# Patient Record
Sex: MALE | Race: WHITE | URBAN - METROPOLITAN AREA
[De-identification: names, ages, dates, MRNs, and addresses within clinical notes are randomized per-mention and may not be internally consistent; named-entity substitution may affect disease eponyms.]

---

## 2019-04-02 ENCOUNTER — MEDICAL CORRESPONDENCE (OUTPATIENT)
Dept: HEALTH INFORMATION MANAGEMENT | Facility: CLINIC | Age: 49
End: 2019-04-02

## 2019-05-01 NOTE — PROGRESS NOTES
[place] - Rheumatology Clinic Visit     Dixon Steele MRN# [id number]   YOB: 1970    Primary care provider: No primary care provider on file.  May 7, 2019          Assessment and Plan:   # Polyarthritis 3/19  # [place] travel 2/19 followed by Salmonella and E.coli gastro-enteritis in 3/19  # Anemia, thrombocytosis, elevated sed rate > 30; elevated CRP > 60    Synovial fluid - negative for crystals; cell count > 5000  Lyme neg.   Syphilis screen negative.   RF, SABINO neg.   Uric acid 5.3  Creat, AST, ALT within normal limits  HBV, HCV, HIV neg.     Likely reactive arthritis vs gout.   Though uric acid level is not high and synovial fluid negative for gout, still gout is possible. I have ordered DECT. If Dual energy CT is also negative for gout, then we will treat this as reactive arthritis. Labs ordered.     Prednisone - high dose did not help. This is unusual. We will try methylprednisolone.   Local cortisone injection in one knee helped.     Anemia- no GI bleed symptoms. Watch.     The labs from patient records are reviewed.     I will be back in touch with the patient through mychart/letter when results are available.     Patient agrees with the above mentioned treatment plan.     Most Recent Immunizations   Administered Date(s) Administered     TDAP Vaccine (Adacel) 05/28/2008       Orders Placed This Encounter   Procedures     CT Foot Left w/o Contrast- Dual Energy     Cyclic Citrullinated Peptide Antibody IgG     Erythrocyte sedimentation rate auto     CRP inflammation     CBC with platelets differential     HLA-B27 Typing       No follow-ups on file.    There are no discontinued medications.  Current Outpatient Medications   Medication Sig Dispense Refill     lisinopril (PRINIVIL/ZESTRIL) 20 MG tablet Take 20 mg by mouth daily       methylPREDNISolone (MEDROL) 32 MG tablet Take 1 tablet (32 mg) by mouth daily Take 32 mg PO daily X 5 days; then 16mg PO daily X 5 days and then 8mg PO daily X 5 days  and then stop 10 tablet 0     oxyCODONE-acetaminophen (PERCOCET) 5-325 MG tablet Take 1-2 tablets by mouth as needed for pain         Mark Jones MD  [facility]          Active Problem List:     Patient Active Problem List    Diagnosis Date Noted     Laceration of finger 05/29/2008     Priority: Medium            History of Present Illness:     Chief Complaint   Patient presents with     Establish Care     Referral     Bev Moe [facility]       May 1, 2019  Have you ever seen a rheumatologist NO Who NA When NA  Joint pain history  Onset: Patient is here to establish care for pain and swelling in left foot, ankle, and knee for 2 months. Pain and swelling is consistant.  Involved joints: see above  Pain scale:  7/10     Wakes the patient from sleep : Yes  Morning stiffness:Yes for 3 minutes  Meds used:percocet, aleve, ibuprofen     Interim history  Since last visit:  1. Infections - Yes, March salmincola and E-coli-shortly after had symptoms of gout  2. New symptoms/medical problem - Yes, shoulder and upper back pain  3. Any side effects from Rheum medications -NA  3. ER visits/Hospitalizations/surgeries - No  4. Last PCP visit: 3/26/19      Wt Readings from Last 4 Encounters:   05/07/19 99.8 kg (220 lb)   06/11/08 93.4 kg (206 lb)   06/03/08 94.8 kg (209 lb)   05/29/08 94.8 kg (209 lb)     Rash under left breast area  chest pain and back pain X 2 weeks- patient to discuss with PCP    No h/o myalgia, unintentional weight loss, fevers, swollen glands  No family or personal history of psoriasis, ulcerative colitis or chron's disease. No h/o iritis.  Patient denies any raynauds  No h/o persistent shortness of breath, cough,   No h/o persistent vomiting, diarrhea, abdominal pain  No h/o hematochezia, hematuria, hemoptysis  No h/o seizure    BP Readings from Last 3 Encounters:   05/07/19 122/86   06/11/08 100/60   06/03/08 108/80              Review of Systems:   Complete ROS negative  "except for symptoms mentioned in the HPI          Past Medical History:   No major past medical problems.   No past surgical history on file.         Social History:     Social History     Occupational History     Not on file   Tobacco Use     Smoking status: Never Smoker     Smokeless tobacco: Never Used   Substance and Sexual Activity     Alcohol use: Not on file     Drug use: Not on file     Sexual activity: Not on file            Family History:   No reported family history of rheumatological problems.          Allergies:   No Known Allergies         Medications:     Current Outpatient Medications   Medication Sig Dispense Refill     lisinopril (PRINIVIL/ZESTRIL) 20 MG tablet Take 20 mg by mouth daily       methylPREDNISolone (MEDROL) 32 MG tablet Take 1 tablet (32 mg) by mouth daily Take 32 mg PO daily X 5 days; then 16mg PO daily X 5 days and then 8mg PO daily X 5 days and then stop 10 tablet 0     oxyCODONE-acetaminophen (PERCOCET) 5-325 MG tablet Take 1-2 tablets by mouth as needed for pain              Physical Exam:   Blood pressure 122/86, pulse 61, temperature 98.1  F (36.7  C), temperature source Oral, height 1.753 m (5' 9\"), weight 99.8 kg (220 lb), SpO2 98 %.  Wt Readings from Last 4 Encounters:   05/07/19 99.8 kg (220 lb)   06/11/08 93.4 kg (206 lb)   06/03/08 94.8 kg (209 lb)   05/29/08 94.8 kg (209 lb)       Constitutional: well-developed, appearing stated age; cooperative  Eyes: normal conjunctiva, sclera  ENT: nl external ears, nose, lips.No mucous membrane lesions, normal saliva pool  Neck: no cervical lymphadenopathy  Resp: lungs clear to auscultation in the bases,   CV: RRR, no added sounds  GI: Abdomen soft and no tenderness  : not tested  Lymph: no cervical, supraclavicular or epitrochlear nodes  MS: Joint tenderness and swelling in all MTPs in left foot. Edema in left ankle and foot. Left ankle ROM is good. Mild effusion in left knee with slight reduction of ROM. Right knee is within " normal limits.   All shoulder, elbow, wrist, MCP/PIP/DIP, hip, joints were examined and  found without active synovitis or major deformity. Full ROM.  No dactylitis,  tenosynovitis, enthesopathy.  Skin: no rash in exposed areas  Psych: nl judgement, orientation, memory, affect.         Data:         Mark Jones MD    [facility]

## 2019-05-07 ENCOUNTER — ANCILLARY PROCEDURE (OUTPATIENT)
Dept: CT IMAGING | Facility: CLINIC | Age: 49
End: 2019-05-07
Attending: INTERNAL MEDICINE
Payer: COMMERCIAL

## 2019-05-07 ENCOUNTER — OFFICE VISIT (OUTPATIENT)
Dept: RHEUMATOLOGY | Facility: CLINIC | Age: 49
End: 2019-05-07
Payer: COMMERCIAL

## 2019-05-07 VITALS
TEMPERATURE: 98.1 F | WEIGHT: 220 LBS | DIASTOLIC BLOOD PRESSURE: 86 MMHG | SYSTOLIC BLOOD PRESSURE: 122 MMHG | OXYGEN SATURATION: 98 % | HEIGHT: 69 IN | HEART RATE: 61 BPM | BODY MASS INDEX: 32.58 KG/M2

## 2019-05-07 DIAGNOSIS — M19.90 INFLAMMATORY ARTHRITIS: Primary | ICD-10-CM

## 2019-05-07 DIAGNOSIS — M19.90 INFLAMMATORY ARTHRITIS: ICD-10-CM

## 2019-05-07 LAB
BASOPHILS # BLD AUTO: 0.1 10E9/L (ref 0–0.2)
BASOPHILS NFR BLD AUTO: 0.5 %
CRP SERPL-MCNC: 11 MG/L (ref 0–8)
DIFFERENTIAL METHOD BLD: ABNORMAL
EOSINOPHIL # BLD AUTO: 0 10E9/L (ref 0–0.7)
EOSINOPHIL NFR BLD AUTO: 0.2 %
ERYTHROCYTE [DISTWIDTH] IN BLOOD BY AUTOMATED COUNT: 13.9 % (ref 10–15)
ERYTHROCYTE [SEDIMENTATION RATE] IN BLOOD BY WESTERGREN METHOD: 33 MM/H (ref 0–15)
HCT VFR BLD AUTO: 33.3 % (ref 40–53)
HGB BLD-MCNC: 10.5 G/DL (ref 13.3–17.7)
LYMPHOCYTES # BLD AUTO: 3 10E9/L (ref 0.8–5.3)
LYMPHOCYTES NFR BLD AUTO: 30.5 %
MCH RBC QN AUTO: 27.6 PG (ref 26.5–33)
MCHC RBC AUTO-ENTMCNC: 31.5 G/DL (ref 31.5–36.5)
MCV RBC AUTO: 88 FL (ref 78–100)
MONOCYTES # BLD AUTO: 0.9 10E9/L (ref 0–1.3)
MONOCYTES NFR BLD AUTO: 9 %
NEUTROPHILS # BLD AUTO: 6 10E9/L (ref 1.6–8.3)
NEUTROPHILS NFR BLD AUTO: 59.8 %
PLATELET # BLD AUTO: 313 10E9/L (ref 150–450)
RBC # BLD AUTO: 3.8 10E12/L (ref 4.4–5.9)
WBC # BLD AUTO: 10 10E9/L (ref 4–11)

## 2019-05-07 PROCEDURE — 99204 OFFICE O/P NEW MOD 45 MIN: CPT | Performed by: INTERNAL MEDICINE

## 2019-05-07 PROCEDURE — 81374 HLA I TYPING 1 ANTIGEN LR: CPT | Performed by: INTERNAL MEDICINE

## 2019-05-07 PROCEDURE — 36415 COLL VENOUS BLD VENIPUNCTURE: CPT | Performed by: INTERNAL MEDICINE

## 2019-05-07 PROCEDURE — 85652 RBC SED RATE AUTOMATED: CPT | Performed by: INTERNAL MEDICINE

## 2019-05-07 PROCEDURE — 86481 TB AG RESPONSE T-CELL SUSP: CPT | Performed by: INTERNAL MEDICINE

## 2019-05-07 PROCEDURE — 85025 COMPLETE CBC W/AUTO DIFF WBC: CPT | Performed by: INTERNAL MEDICINE

## 2019-05-07 PROCEDURE — 86200 CCP ANTIBODY: CPT | Performed by: INTERNAL MEDICINE

## 2019-05-07 PROCEDURE — 86140 C-REACTIVE PROTEIN: CPT | Performed by: INTERNAL MEDICINE

## 2019-05-07 RX ORDER — LISINOPRIL 20 MG/1
20 TABLET ORAL DAILY
COMMUNITY
Start: 2019-03-04 | End: 2020-03-03

## 2019-05-07 RX ORDER — METHYLPREDNISOLONE 32 MG/1
32 TABLET ORAL DAILY
Qty: 10 TABLET | Refills: 0 | Status: SHIPPED | OUTPATIENT
Start: 2019-05-07 | End: 2019-05-28

## 2019-05-07 RX ORDER — OXYCODONE AND ACETAMINOPHEN 5; 325 MG/1; MG/1
1-2 TABLET ORAL PRN
COMMUNITY
Start: 2019-03-23

## 2019-05-07 ASSESSMENT — ROUTINE ASSESSMENT OF PATIENT INDEX DATA (RAPID3)
RAPID3 INTERPRETATION: HIGH > 12.0
TOTAL RAPID3 SCORE: 17.7

## 2019-05-07 ASSESSMENT — MIFFLIN-ST. JEOR: SCORE: 1868.29

## 2019-05-07 NOTE — NURSING NOTE
"Chief Complaint   Patient presents with     Establish Care     Referral     Bev Abhi [facility]       Initial /86   Pulse 61   Temp 98.1  F (36.7  C) (Oral)   Ht 1.753 m (5' 9\")   Wt 99.8 kg (220 lb)   SpO2 98%   BMI 32.49 kg/m   Estimated body mass index is 32.49 kg/m  as calculated from the following:    Height as of this encounter: 1.753 m (5' 9\").    Weight as of this encounter: 99.8 kg (220 lb).  Medication Reconciliation: complete    Have you ever seen a rheumatologist NO Who NA When NA  Joint pain history  Onset: Patient is here to establish care for pain and swelling in left foot, ankle, and knee for 2 months. Pain and swelling is consistant.  Involved joints: see above  Pain scale:  7/10     Wakes the patient from sleep : Yes  Morning stiffness:Yes for 3 minutes  Meds used:percocet, aleve, ibuprofen    Interim history  Since last visit:  1. Infections - Yes, March salmincola and E-coli-shortly after had symptoms of gout  2. New symptoms/medical problem - Yes, shoulder and upper back pain  3. Any side effects from Rheum medications -NA  3. ER visits/Hospitalizations/surgeries - No  4. Last PCP visit: 3/26/19  Wt Readings from Last 4 Encounters:   05/07/19 99.8 kg (220 lb)   06/11/08 93.4 kg (206 lb)   06/03/08 94.8 kg (209 lb)   05/29/08 94.8 kg (209 lb)     BP Readings from Last 3 Encounters:   05/07/19 122/86   06/11/08 100/60   06/03/08 108/80       "

## 2019-05-07 NOTE — DISCHARGE INSTRUCTIONS

## 2019-05-07 NOTE — LETTER
[facility]  [address] [address]  [phone]      5/13/2019       Dixon Steele  [address] [place] [address]        Dear Dixon,    I am writing you regarding your laboratory testing you had done recently. Upon review your inflammatory markers are elevated.   HLA B27 gene test is positive. This is consistent with my diagnosis of reactive arthritis.   TB screen normal.   CCP rheumatoid antibody normal.   Anemia noted.   We will discuss more when we meet 5/28/19.    Lab results attached.    Thank you for allowing me to participate in your care.    Sincerely,      Mark Jones MD   Rheumatology

## 2019-05-08 LAB — CCP AB SER IA-ACNC: 1 U/ML

## 2019-05-09 DIAGNOSIS — M19.90 INFLAMMATORY ARTHRITIS: Primary | ICD-10-CM

## 2019-05-09 LAB
GAMMA INTERFERON BACKGROUND BLD IA-ACNC: 0.03 IU/ML
M TB IFN-G BLD-IMP: NEGATIVE
M TB IFN-G CD4+ BCKGRND COR BLD-ACNC: >10 IU/ML
MITOGEN IGNF BCKGRD COR BLD-ACNC: 0 IU/ML
MITOGEN IGNF BCKGRD COR BLD-ACNC: 0.01 IU/ML

## 2019-05-09 NOTE — TELEPHONE ENCOUNTER
Patient called with results  CT scan was negative for gout.   My diagnosis for his arthritis is REACTIVE ARTHRITIS.   In addition to the temporary medrol that he is currently on, I recommend starting sulfasalazine (long-term medication for reactive arthritis). I can explain more about this medication when I see him in person. If he wants to get started now, I can send the RX.       Patient is in agreement with medication. Would like sent. T'd up. Please review and advise.     Patient wanted letter and lab results mailed to him.    Please review labs and send back to [place] for letter and labs to be mailed out.     Roxann Bates RN Flex

## 2019-05-09 NOTE — RESULT ENCOUNTER NOTE
Please call patient with the results:  CT scan was negative for gout.   My diagnosis for his arthritis is REACTIVE ARTHRITIS.   In addition to the temporary medrol that he is currently on, I recommend starting sulfasalazine (long-term medication for reactive arthritis). I can explain more about this medication when I see him in person. If he wants to get started now, I can send the RX.     Sincerely    Mark Jones MD  [facility]

## 2019-05-10 LAB
B LOCUS: NORMAL
B27TEST METHOD: NORMAL

## 2019-05-10 RX ORDER — SULFASALAZINE 500 MG/1
TABLET, DELAYED RELEASE ORAL
Qty: 120 TABLET | Refills: 0 | Status: SHIPPED | OUTPATIENT
Start: 2019-05-10 | End: 2019-05-28

## 2019-05-10 RX ORDER — SULFASALAZINE 500 MG/1
500 TABLET ORAL 4 TIMES DAILY
Status: CANCELLED | OUTPATIENT
Start: 2019-05-10

## 2019-05-10 NOTE — RESULT ENCOUNTER NOTE
Please send letter:  Inflammatory markers are elevated.   HLA B27 gene test is positive. This is consistent with my diagnosis of reactive arthritis.   TB screen normal.   CCP rheumatoid antibody normal.   Anemia noted.   We will discuss more when we meet.         Sincerely    Mark Jones MD  [facility]

## 2019-05-10 NOTE — TELEPHONE ENCOUNTER
Rx mailed to [facility] pharmacy. Awaiting provider to review lab results for letter.    Mila Hebert, CMA

## 2019-05-15 ENCOUNTER — TELEPHONE (OUTPATIENT)
Dept: RHEUMATOLOGY | Facility: CLINIC | Age: 49
End: 2019-05-15

## 2019-05-15 NOTE — TELEPHONE ENCOUNTER
----- Message from Mark Jones MD sent at 5/14/2019  1:24 PM CDT -----  I spoke to a physician on that line and explained that the CT was medically indicated in his situation to differentiate between gout and reactive arthritis.  Please let patient know and document. Thanks    ----- Message -----  From: Mila Hebert CMA  Sent: 5/14/2019  12:38 PM  To: MD Garland Sutherland,    Have you had a chance to call the physician reviewer line for this patient yet?    Mila  ----- Message -----  From: Altagracia Michael  Sent: 5/10/2019   9:37 AM  To: Mila Hebert CMA, #    Hi,    I do the pre-authorizations for CT's. I attempted to obtain a pre-authorization for the 01524, CT LT foot that was performed on 5/7/19.    I received a call jacob yesterday from [facility] advising me they are denying the pre-authorization for the CT.      Can  call [facility] at [phone] to speak to a physician reviewer. Option #1 for provider, then option 2 for diagnostic imaging then option 2 to speak to a physician reviewer.     You will need the patient's ID # [id number] and his date of birth   1970.    Thank You,  Altagracia Michael  [phone]

## 2019-05-15 NOTE — TELEPHONE ENCOUNTER
I called and spoke with patient and informed him of pre-authorization done for CT. Patient also questioned if can be taking prednisone while starting the sulfasalazine? Patient informed that these are not contradicted taking together. Patient had no further questions.    Mila Hebert, CMA

## 2019-05-16 NOTE — PROGRESS NOTES
[place] - Rheumatology Clinic Visit     Dixon Steele MRN# [id number]   YOB: 1970    Primary care provider: No primary care provider on file.  May 28, 2019          Assessment and Plan:   # Polyarthritis 3/19; HLA B27 positive  # [place] travel 2/19 followed by Salmonella and E.coli gastro-enteritis in 3/19  # Anemia, thrombocytosis, elevated sed rate > 30; elevated CRP > 60    Synovial fluid - negative for crystals; cell count > 5000  Lyme neg.   Syphilis screen negative.   RF, SABINO neg.   Uric acid 5.3  Creat, AST, ALT within normal limits  HBV, HCV, HIV neg     Inflammatory markers are elevated.   HLA B27 gene test is positive. This is consistent with my diagnosis of reactive arthritis.   TB screen normal.   CCP rheumatoid antibody normal.   Anemia noted.     CT dual energy for gout in foot: NEGATIVE. This was clearly indicated but his insurance has declined coverage for this.     Likely reactive arthritis vs ankylosing spondylitis. He likely needs MRI of SI joints sooner or later to look for signs of ankylosing spondylitis. We discussed about his insurance not covering for recent CT today. MRI SI joints may be discussed next visit especially if sulfasalazine is not working for him.     Prednisone - did not help much as sometimes seen in spondyloarthropathy. NSAIDs may work better.     Anemia- no GI bleed symptoms. Watch. Recheck labs today.     Continue sulfasalazine for 4 more weeks. If not clinically improving, then consider adding anti-TNF like humira.      Notified patient that my last day at clinic in [place] is mid-June. Gave options to go to [facility] for rheumatology.     The labs from patient records are reviewed.     I will be back in touch with the patient through mychart/letter when results are available.     Patient agrees with the above mentioned treatment plan.     Most Recent Immunizations   Administered Date(s) Administered     TDAP Vaccine (Adacel) 05/28/2008       Orders  Placed This Encounter   Procedures     CBC with platelets differential     CRP inflammation     Erythrocyte sedimentation rate auto     AST     ALT       No follow-ups on file.    Medications Discontinued During This Encounter   Medication Reason     methylPREDNISolone (MEDROL) 32 MG tablet Therapy completed     sulfaSALAzine ER (AZULFIDINE EN) 500 MG EC tablet Reorder     Current Outpatient Medications   Medication Sig Dispense Refill     lisinopril (PRINIVIL/ZESTRIL) 20 MG tablet Take 20 mg by mouth daily       oxyCODONE-acetaminophen (PERCOCET) 5-325 MG tablet Take 1-2 tablets by mouth as needed for pain       sulfaSALAzine ER (AZULFIDINE EN) 500 MG EC tablet 1000mg in the AM and 1000mg in the PM daily 200 tablet 0       Mark Jones MD  [facility]          Active Problem List:     Patient Active Problem List    Diagnosis Date Noted     Laceration of finger 05/29/2008     Priority: Medium            History of Present Illness:     Chief Complaint   Patient presents with     RECHECK       May 1, 2019  Have you ever seen a rheumatologist NO Who NA When NA  Joint pain history  Onset: Patient is here to establish care for pain and swelling in left foot, ankle, and knee for 2 months. Pain and swelling is consistant.  Involved joints: see above  Pain scale:  7/10     Wakes the patient from sleep : Yes  Morning stiffness:Yes for 3 minutes  Meds used:percocet, aleve, ibuprofen     Interim history  Since last visit:  1. Infections - Yes, March salmincola and E-coli-shortly after had symptoms of gout  2. New symptoms/medical problem - Yes, shoulder and upper back pain  3. Any side effects from Rheum medications -NA  3. ER visits/Hospitalizations/surgeries - No  4. Last PCP visit: 3/26/19      Wt Readings from Last 4 Encounters:   05/28/19 100.7 kg (222 lb)   05/07/19 99.8 kg (220 lb)   06/11/08 93.4 kg (206 lb)   06/03/08 94.8 kg (209 lb)     Rash under left breast area  chest pain and back pain X 2  weeks- patient to discuss with PCP    No h/o myalgia, unintentional weight loss, fevers, swollen glands  No family or personal history of psoriasis, ulcerative colitis or chron's disease. No h/o iritis.  Patient denies any raynauds  No h/o persistent shortness of breath, cough,   No h/o persistent vomiting, diarrhea, abdominal pain  No h/o hematochezia, hematuria, hemoptysis  No h/o seizure    May 28, 2019  Have you ever seen a rheumatologist yes Who you When 5/7/19  Joint pain history  Onset: Patient is here for a follow up. Reports no change since starting sulfasalazine.  Involved joints: left foot, ankle, knee, back  Pain scale:  8.5/10     Wakes the patient from sleep : Yes  Morning stiffness:Yes for 60 minutes  Meds used:sulfasalazine     Interim history  Since last visit:  1. Infections - No  2. New symptoms/medical problem - Yes, back pain that is consistant  3. Any side effects from Rheum medications -none  3. ER visits/Hospitalizations/surgeries - No  4. Last PCP visit: 3/26/19    BP Readings from Last 3 Encounters:   05/28/19 150/80   05/07/19 122/86   06/11/08 100/60              Review of Systems:   Complete ROS negative except for symptoms mentioned in the HPI          Past Medical History:   No major past medical problems.   No past surgical history on file.         Social History:     Social History     Occupational History     Not on file   Tobacco Use     Smoking status: Never Smoker     Smokeless tobacco: Never Used   Substance and Sexual Activity     Alcohol use: Not on file     Drug use: Not on file     Sexual activity: Not on file            Family History:   No reported family history of rheumatological problems.          Allergies:   No Known Allergies         Medications:     Current Outpatient Medications   Medication Sig Dispense Refill     lisinopril (PRINIVIL/ZESTRIL) 20 MG tablet Take 20 mg by mouth daily       oxyCODONE-acetaminophen (PERCOCET) 5-325 MG tablet Take 1-2 tablets by mouth  "as needed for pain       sulfaSALAzine ER (AZULFIDINE EN) 500 MG EC tablet 1000mg in the AM and 1000mg in the PM daily 200 tablet 0            Physical Exam:   Blood pressure 150/80, pulse 59, height 1.753 m (5' 9\"), weight 100.7 kg (222 lb), SpO2 99 %.  Wt Readings from Last 4 Encounters:   05/28/19 100.7 kg (222 lb)   05/07/19 99.8 kg (220 lb)   06/11/08 93.4 kg (206 lb)   06/03/08 94.8 kg (209 lb)       Constitutional: well-developed, appearing stated age; cooperative  MS: Mild effusion in left knee with slight reduction of ROM. Right knee is within normal limits. Tenderness in left hip with internal and external rotation. ROM in other peripheral joints within normal limits without synovitis. Foot not examined today.   Skin: no rash in exposed areas  Psych: nl judgement, orientation, memory, affect.         Data:         Mark Jones MD    [facility]    "

## 2019-05-28 ENCOUNTER — OFFICE VISIT (OUTPATIENT)
Dept: RHEUMATOLOGY | Facility: CLINIC | Age: 49
End: 2019-05-28
Payer: COMMERCIAL

## 2019-05-28 VITALS
BODY MASS INDEX: 32.88 KG/M2 | HEART RATE: 59 BPM | WEIGHT: 222 LBS | SYSTOLIC BLOOD PRESSURE: 150 MMHG | DIASTOLIC BLOOD PRESSURE: 80 MMHG | OXYGEN SATURATION: 99 % | HEIGHT: 69 IN

## 2019-05-28 DIAGNOSIS — M02.30 REACTIVE ARTHRITIS (H): Primary | ICD-10-CM

## 2019-05-28 DIAGNOSIS — M19.90 INFLAMMATORY ARTHRITIS: ICD-10-CM

## 2019-05-28 LAB
ALT SERPL W P-5'-P-CCNC: 24 U/L (ref 0–70)
AST SERPL W P-5'-P-CCNC: 11 U/L (ref 0–45)
BASOPHILS # BLD AUTO: 0 10E9/L (ref 0–0.2)
BASOPHILS NFR BLD AUTO: 0.5 %
CRP SERPL-MCNC: 6.6 MG/L (ref 0–8)
DIFFERENTIAL METHOD BLD: ABNORMAL
EOSINOPHIL # BLD AUTO: 0.1 10E9/L (ref 0–0.7)
EOSINOPHIL NFR BLD AUTO: 0.7 %
ERYTHROCYTE [DISTWIDTH] IN BLOOD BY AUTOMATED COUNT: 14.8 % (ref 10–15)
ERYTHROCYTE [SEDIMENTATION RATE] IN BLOOD BY WESTERGREN METHOD: 15 MM/H (ref 0–15)
HCT VFR BLD AUTO: 38 % (ref 40–53)
HGB BLD-MCNC: 11.9 G/DL (ref 13.3–17.7)
LYMPHOCYTES # BLD AUTO: 3.2 10E9/L (ref 0.8–5.3)
LYMPHOCYTES NFR BLD AUTO: 38.1 %
MCH RBC QN AUTO: 27.9 PG (ref 26.5–33)
MCHC RBC AUTO-ENTMCNC: 31.3 G/DL (ref 31.5–36.5)
MCV RBC AUTO: 89 FL (ref 78–100)
MONOCYTES # BLD AUTO: 0.9 10E9/L (ref 0–1.3)
MONOCYTES NFR BLD AUTO: 10.2 %
NEUTROPHILS # BLD AUTO: 4.3 10E9/L (ref 1.6–8.3)
NEUTROPHILS NFR BLD AUTO: 50.5 %
PLATELET # BLD AUTO: 273 10E9/L (ref 150–450)
RBC # BLD AUTO: 4.27 10E12/L (ref 4.4–5.9)
WBC # BLD AUTO: 8.5 10E9/L (ref 4–11)

## 2019-05-28 PROCEDURE — 85652 RBC SED RATE AUTOMATED: CPT | Performed by: INTERNAL MEDICINE

## 2019-05-28 PROCEDURE — 84460 ALANINE AMINO (ALT) (SGPT): CPT | Performed by: INTERNAL MEDICINE

## 2019-05-28 PROCEDURE — 85025 COMPLETE CBC W/AUTO DIFF WBC: CPT | Performed by: INTERNAL MEDICINE

## 2019-05-28 PROCEDURE — 86140 C-REACTIVE PROTEIN: CPT | Performed by: INTERNAL MEDICINE

## 2019-05-28 PROCEDURE — 84450 TRANSFERASE (AST) (SGOT): CPT | Performed by: INTERNAL MEDICINE

## 2019-05-28 PROCEDURE — 36415 COLL VENOUS BLD VENIPUNCTURE: CPT | Performed by: INTERNAL MEDICINE

## 2019-05-28 PROCEDURE — 99213 OFFICE O/P EST LOW 20 MIN: CPT | Performed by: INTERNAL MEDICINE

## 2019-05-28 RX ORDER — SULFASALAZINE 500 MG/1
TABLET, DELAYED RELEASE ORAL
Qty: 200 TABLET | Refills: 0 | Status: SHIPPED | OUTPATIENT
Start: 2019-05-28

## 2019-05-28 ASSESSMENT — ROUTINE ASSESSMENT OF PATIENT INDEX DATA (RAPID3)
TOTAL RAPID3 SCORE: 21.2
RAPID3 INTERPRETATION: HIGH > 12.0

## 2019-05-28 ASSESSMENT — MIFFLIN-ST. JEOR: SCORE: 1877.37

## 2019-05-28 NOTE — TELEPHONE ENCOUNTER
Patient was in clinic today and questioned about any updates regarding this. Patient was given [employer] billing line to check the status of bill.    Mila Hebert, CMA

## 2019-05-28 NOTE — NURSING NOTE
"Chief Complaint   Patient presents with     RECHECK       Initial Pulse 59   Ht 1.753 m (5' 9\")   Wt 100.7 kg (222 lb)   SpO2 99%   BMI 32.78 kg/m   Estimated body mass index is 32.78 kg/m  as calculated from the following:    Height as of this encounter: 1.753 m (5' 9\").    Weight as of this encounter: 100.7 kg (222 lb).  Medication Reconciliation: complete    Have you ever seen a rheumatologist yes Who you When 5/7/19  Joint pain history  Onset: Patient is here for a follow up. Reports no change since starting sulfasalazine.  Involved joints: left foot, ankle, knee, back  Pain scale:  8.5/10     Wakes the patient from sleep : Yes  Morning stiffness:Yes for 60 minutes  Meds used:sulfasalazine    Interim history  Since last visit:  1. Infections - No  2. New symptoms/medical problem - Yes, back pain that is consistant  3. Any side effects from Rheum medications -none  3. ER visits/Hospitalizations/surgeries - No  4. Last PCP visit: 3/26/19  Wt Readings from Last 4 Encounters:   05/28/19 100.7 kg (222 lb)   05/07/19 99.8 kg (220 lb)   06/11/08 93.4 kg (206 lb)   06/03/08 94.8 kg (209 lb)     BP Readings from Last 3 Encounters:   05/07/19 122/86   06/11/08 100/60   06/03/08 108/80       "

## 2019-05-31 NOTE — RESULT ENCOUNTER NOTE
Please send a letter to the patient with a copy of the lab results and also the following note:  Inflammatory markers are normalized.   Anemia is improving. Good.